# Patient Record
Sex: FEMALE
[De-identification: names, ages, dates, MRNs, and addresses within clinical notes are randomized per-mention and may not be internally consistent; named-entity substitution may affect disease eponyms.]

---

## 2020-12-28 PROBLEM — Z00.00 ENCOUNTER FOR PREVENTIVE HEALTH EXAMINATION: Status: ACTIVE | Noted: 2020-12-28

## 2021-01-07 ENCOUNTER — LABORATORY RESULT (OUTPATIENT)
Age: 61
End: 2021-01-07

## 2021-01-07 ENCOUNTER — APPOINTMENT (OUTPATIENT)
Dept: RHEUMATOLOGY | Facility: CLINIC | Age: 61
End: 2021-01-07
Payer: COMMERCIAL

## 2021-01-07 VITALS
HEART RATE: 73 BPM | SYSTOLIC BLOOD PRESSURE: 170 MMHG | DIASTOLIC BLOOD PRESSURE: 91 MMHG | WEIGHT: 120.38 LBS | HEIGHT: 63 IN | BODY MASS INDEX: 21.33 KG/M2 | TEMPERATURE: 98.2 F

## 2021-01-07 VITALS — OXYGEN SATURATION: 100 %

## 2021-01-07 DIAGNOSIS — R21 RASH AND OTHER NONSPECIFIC SKIN ERUPTION: ICD-10-CM

## 2021-01-07 DIAGNOSIS — R74.01 ELEVATION OF LEVELS OF LIVER TRANSAMINASE LEVELS: ICD-10-CM

## 2021-01-07 DIAGNOSIS — Z82.49 FAMILY HISTORY OF ISCHEMIC HEART DISEASE AND OTHER DISEASES OF THE CIRCULATORY SYSTEM: ICD-10-CM

## 2021-01-07 DIAGNOSIS — M79.89 OTHER SPECIFIED SOFT TISSUE DISORDERS: ICD-10-CM

## 2021-01-07 DIAGNOSIS — Z82.3 FAMILY HISTORY OF STROKE: ICD-10-CM

## 2021-01-07 DIAGNOSIS — Z80.9 FAMILY HISTORY OF MALIGNANT NEOPLASM, UNSPECIFIED: ICD-10-CM

## 2021-01-07 DIAGNOSIS — Z78.9 OTHER SPECIFIED HEALTH STATUS: ICD-10-CM

## 2021-01-07 DIAGNOSIS — L30.9 DERMATITIS, UNSPECIFIED: ICD-10-CM

## 2021-01-07 PROCEDURE — 99205 OFFICE O/P NEW HI 60 MIN: CPT | Mod: 25

## 2021-01-07 PROCEDURE — 99072 ADDL SUPL MATRL&STAF TM PHE: CPT

## 2021-01-07 PROCEDURE — 36415 COLL VENOUS BLD VENIPUNCTURE: CPT

## 2021-01-07 RX ORDER — MULTIVIT-MIN/FA/LYCOPEN/LUTEIN .4-300-25
TABLET ORAL
Refills: 0 | Status: ACTIVE | COMMUNITY

## 2021-01-07 RX ORDER — ASPIRIN ENTERIC COATED TABLETS 81 MG 81 MG/1
81 TABLET, DELAYED RELEASE ORAL
Refills: 0 | Status: ACTIVE | COMMUNITY

## 2021-01-07 RX ORDER — PHENYLEPHRINE HCL 10 MG
TABLET ORAL
Refills: 0 | Status: ACTIVE | COMMUNITY

## 2021-01-07 NOTE — REVIEW OF SYSTEMS
[Chills] : chills [Feeling Poorly] : feeling poorly [Feeling Tired] : feeling tired [Recent Weight Loss (___ Lbs)] : recent [unfilled] ~Ulb weight loss [SOB on Exertion] : shortness of breath during exertion [Arthralgias] : arthralgias [Joint Pain] : joint pain [Joint Swelling] : joint swelling [Joint Stiffness] : joint stiffness [Skin Lesions] : skin lesion [Itching] : itching [Limb Weakness] : limb weakness [Muscle Weakness] : muscle weakness [Fever] : no fever [Eye Pain] : no eye pain [Red Eyes] : eyes not red [Dry Eyes] : no dryness of the eyes [Chest Pain] : no chest pain [Palpitations] : no palpitations [Shortness Of Breath] : no shortness of breath [Wheezing] : no wheezing [Constipation] : no constipation [Diarrhea] : no diarrhea [Heartburn] : no heartburn [FreeTextEntry4] : Canker sores and dry mouth [de-identified] : As per HPI [de-identified] : Muscle weakness muscle tenderness

## 2021-01-07 NOTE — DATA REVIEWED
[FreeTextEntry1] : Laboratory data brought by patient was reviewed with her and her  on December 8, 2020 AST was elevated at 187 hepatitis B was negative CMV was consistent with prior exposure hepatitis C was negative CBC at that point revealed a hemoglobin of 9.3 white count and platelets were normal back in October 6 high-sensitivity CRP appeared normal thyroid studies appear normal including TSH rheumatoid factor was negative CARMEN was negative vitamin B12 levels were normal ESR at that point October 2 was elevated at 58 ALT was elevated 73 AST was elevated at 127 CBC at that point was also low at 8.9 more recent labs ANCA was negative smooth muscle antibody was negative dermatopathology report from thigh area was reviewed showing hypersensitivity reaction-she also reports that she did have x-rays of hands done at [facility] not available for me to review but apparently was told only of some thumb arthritis

## 2021-01-07 NOTE — ASSESSMENT
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 initially presenting with canker sores but then developing rashes joint pains and stiffness wrist swelling worse in the morning also some muscle pains and weakness as well as rash initially on hands now predominantly on thigh she has had elevated ESR she has had elevated transaminases-her clinical exam does show evidence of synovitis particularly in both wrists I think this could certainly represent a systemic rheumatic disease possibly rheumatoid arthritis possibly dermatomyositis with the elevated ALT and AST being of skeletal muscle I am doing a CPK I am repeating serologies inflammatory markers rheumatoid factor CCP I am also doing a tova - marker panel-I am also doing a G6PD screen should hydroxychloroquine be a consideration-I am awaiting these results and plan to discuss them with her and her  when they return for more definitive diagnosis will be made I suspect this may represent a systemic rheumatic process

## 2021-01-07 NOTE — HISTORY OF PRESENT ILLNESS
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-she has had an unexplained illness dating back to September 2020 with both skin and joint involvement prior to her evaluation by me she is seen by dermatology and allergy she is also seen by her cardiologist as well as gastroenterologist she is now being seen by rheumatology at the request of her gastroenterologist-she reports that initial symptoms involved canker sores in her mouth which were quite annoying and bothersome making eating difficult these eventually resolved she had not had these in many years except perhaps 20 to 30 years ago but again not recently she had not been exercising but was starting to try yoga but found this difficult because she was developing swelling in her hands particularly on the palmar surfaces of her hands they were also painful she also began noticing wrist pains and wrist swelling these were significantly worse in the morning she also began developing a rash on her hands and arms and persistent rash on both thigh area these were itchy in nature she was actually seen by dermatology she had a biopsy of the thigh rash which still persists the biopsy actually showed a hypersensitivity reaction a possible drug reaction this has been treated with topicals which have been not that effective-she was seen by allergy she did have patch testing which apparently were noncontributory she has had no fevers or chills again symptoms are worse in the morning she has difficulty in making a fist in the morning she has been taking Advil which has not helped she does report she was given a few days of very low-dose prednisone but stopped this as this also did not help she did have some weight loss of approximately 8 pounds but believes that she is gaining some of that back she has no coughing or shortness of breath but does have difficulty climbing flights of steps she reports she climbs up 2 flights of steps and gets dizzy she has no dry eyes but does have dryness of her mouth-she has been seen by a liver doctor because of some abnormalities in transaminases she does report that she has had muscle weakness in both her arms and her legs difficulty with exercising she is also had some muscle tenderness also

## 2021-01-07 NOTE — PHYSICAL EXAM
[General Appearance - Alert] : alert [General Appearance - In No Acute Distress] : in no acute distress [General Appearance - Well Nourished] : well nourished [General Appearance - Well Developed] : well developed [Sclera] : the sclera and conjunctiva were normal [PERRL With Normal Accommodation] : pupils were equal in size, round, and reactive to light [Outer Ear] : the ears and nose were normal in appearance [Neck Appearance] : the appearance of the neck was normal [Neck Cervical Mass (___cm)] : no neck mass was observed [Exaggerated Use Of Accessory Muscles For Inspiration] : no accessory muscle use [Auscultation Breath Sounds / Voice Sounds] : lungs were clear to auscultation bilaterally [Heart Rate And Rhythm] : heart rate was normal and rhythm regular [Murmurs] : no murmurs [Edema] : there was no peripheral edema [Abdomen Soft] : soft [Abdomen Tenderness] : non-tender [] : no hepato-splenomegaly [Nail Clubbing] : no clubbing  or cyanosis of the fingernails [No Focal Deficits] : no focal deficits [FreeTextEntry1] : Rashes present on the outer thigh more prominent on the right with some erythema no scaling

## 2021-01-07 NOTE — REASON FOR VISIT
[Initial Evaluation] : an initial evaluation [Spouse] : spouse [FreeTextEntry1] : rash / arthritis / increased transaminase

## 2021-01-08 LAB
ALBUMIN SERPL ELPH-MCNC: 4.8 G/DL
ALP BLD-CCNC: 53 U/L
ALT SERPL-CCNC: 105 U/L
ANION GAP SERPL CALC-SCNC: 12 MMOL/L
AST SERPL-CCNC: 142 U/L
BILIRUB SERPL-MCNC: 0.5 MG/DL
BUN SERPL-MCNC: 17 MG/DL
CALCIUM SERPL-MCNC: 10.3 MG/DL
CHLORIDE SERPL-SCNC: 89 MMOL/L
CK SERPL-CCNC: 1950 U/L
CO2 SERPL-SCNC: 27 MMOL/L
CREAT SERPL-MCNC: 0.6 MG/DL
CRP SERPL-MCNC: <0.1 MG/DL
ERYTHROCYTE [SEDIMENTATION RATE] IN BLOOD BY WESTERGREN METHOD: 58 MM/HR
GLUCOSE SERPL-MCNC: 84 MG/DL
POTASSIUM SERPL-SCNC: 4.4 MMOL/L
PROT SERPL-MCNC: 7.7 G/DL
RHEUMATOID FACT SER QL: <10 IU/ML
SODIUM SERPL-SCNC: 128 MMOL/L

## 2021-01-09 LAB
B BURGDOR IGG+IGM SER QL IB: NORMAL
BASOPHILS # BLD AUTO: 0.02 K/UL
BASOPHILS NFR BLD AUTO: 0.4 %
ENA RNP AB SER IA-ACNC: 0.2 AL
ENA SM AB SER IA-ACNC: <0.2 AL
ENA SS-A AB SER IA-ACNC: <0.2 AL
ENA SS-B AB SER IA-ACNC: 1.3 AL
EOSINOPHIL # BLD AUTO: 0.06 K/UL
EOSINOPHIL NFR BLD AUTO: 1.1 %
HCT VFR BLD CALC: 31.2 %
HGB BLD-MCNC: 9.5 G/DL
IMM GRANULOCYTES NFR BLD AUTO: 0.4 %
LYMPHOCYTES # BLD AUTO: 1.38 K/UL
LYMPHOCYTES NFR BLD AUTO: 25.5 %
MAN DIFF?: NORMAL
MCHC RBC-ENTMCNC: 20.1 PG
MCHC RBC-ENTMCNC: 30.4 GM/DL
MCV RBC AUTO: 66.1 FL
MONOCYTES # BLD AUTO: 0.56 K/UL
MONOCYTES NFR BLD AUTO: 10.4 %
NEUTROPHILS # BLD AUTO: 3.37 K/UL
NEUTROPHILS NFR BLD AUTO: 62.2 %
PLATELET # BLD AUTO: 276 K/UL
RBC # BLD: 4.72 M/UL
RBC # FLD: 18.6 %
WBC # FLD AUTO: 5.41 K/UL

## 2021-01-11 LAB
CCP AB SER IA-ACNC: <8 UNITS
RF+CCP IGG SER-IMP: NEGATIVE

## 2021-01-12 LAB
ANA SER IF-ACNC: NEGATIVE
G6PD SER-CCNC: 23.1 U/G HGB

## 2021-01-14 ENCOUNTER — APPOINTMENT (OUTPATIENT)
Dept: RADIOLOGY | Facility: CLINIC | Age: 61
End: 2021-01-14
Payer: COMMERCIAL

## 2021-01-14 ENCOUNTER — APPOINTMENT (OUTPATIENT)
Dept: RHEUMATOLOGY | Facility: CLINIC | Age: 61
End: 2021-01-14
Payer: COMMERCIAL

## 2021-01-14 ENCOUNTER — OUTPATIENT (OUTPATIENT)
Dept: OUTPATIENT SERVICES | Facility: HOSPITAL | Age: 61
LOS: 1 days | End: 2021-01-14

## 2021-01-14 VITALS
SYSTOLIC BLOOD PRESSURE: 165 MMHG | BODY MASS INDEX: 21.09 KG/M2 | HEIGHT: 63 IN | DIASTOLIC BLOOD PRESSURE: 75 MMHG | OXYGEN SATURATION: 95 % | HEART RATE: 78 BPM | WEIGHT: 119 LBS | TEMPERATURE: 98.1 F

## 2021-01-14 PROCEDURE — 99214 OFFICE O/P EST MOD 30 MIN: CPT | Mod: 25

## 2021-01-14 PROCEDURE — 36415 COLL VENOUS BLD VENIPUNCTURE: CPT

## 2021-01-14 PROCEDURE — 71046 X-RAY EXAM CHEST 2 VIEWS: CPT | Mod: 26

## 2021-01-14 PROCEDURE — 99072 ADDL SUPL MATRL&STAF TM PHE: CPT

## 2021-01-14 NOTE — DATA REVIEWED
[FreeTextEntry1] : Data as noted G6PD screen adequate ESR is elevated rheumatoid factor negative ALT AST again elevated with elevated creatinine as noted CRP is normal Lyme serologies are negative CCP and CARMEN both negative awaiting myositis panel

## 2021-01-14 NOTE — REVIEW OF SYSTEMS
[Feeling Tired] : feeling tired [Skin Lesions] : skin lesion [Itching] : itching [Shortness Of Breath] : no shortness of breath [Wheezing] : no wheezing [Cough] : no cough [FreeTextEntry9] : Muscle weakness

## 2021-01-14 NOTE — PHYSICAL EXAM
[General Appearance - Alert] : alert [General Appearance - In No Acute Distress] : in no acute distress [General Appearance - Well Nourished] : well nourished [General Appearance - Well Developed] : well developed [Sclera] : the sclera and conjunctiva were normal [PERRL With Normal Accommodation] : pupils were equal in size, round, and reactive to light [] : no respiratory distress [Exaggerated Use Of Accessory Muscles For Inspiration] : no accessory muscle use [Auscultation Breath Sounds / Voice Sounds] : lungs were clear to auscultation bilaterally [FreeTextEntry1] : No definite muscle weakness or tenderness

## 2021-01-14 NOTE — HISTORY OF PRESENT ILLNESS
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  she was seen by me approximately 1 week ago she had been referred she has been having some muscle weakness she was found to have elevated transaminases she has had a skin rash with a biopsy suggestive of some sort of allergic type of reaction she comes for review of labs she reports that she is actually feeling somewhat better she still has some muscle weakness rash is on changed she has had no fevers or chills she has had no respiratory complaints laboratory studies returned with CPK elevated almost to 2000 ESR also elevated strong suspicion that this might represent either dermatomyositis or another form of myositis possibly polymyositis possibly inclusion body myopathy CARMEN and rheumatoid factor are negative MATA is negative SSB weakly positive.-She denies coughing or shortness of breath no wheezing-regarding cancer screening she is up-to-date on mammography she does see a gynecologist but has had an oophorectomy and hysterectomy her last colonoscopy was 2014 more than likely will need to be updated

## 2021-01-14 NOTE — ASSESSMENT
[FreeTextEntry1] : I have explained to her that this most likely represents an inflammatory muscle disease a form of myositis possible polymyositis possible inclusion body myopathy possible dermatomyositis with rash I am obtaining QuantiFERON hepatitis testing prior to likely immunosuppression she did report she received the first more during her vaccine on December 30, 2020 without issue is scheduled to receive the next mode on her vaccine January 29, 2020 I am getting a chest x-ray and likely will be getting a CT of the chest I am referring her to neurology for a EMG and guidance for muscle biopsy will likely need a muscle biopsy

## 2021-01-15 LAB
HBV SURFACE AG SER QL: NONREACTIVE
HCV AB SER QL: NONREACTIVE
HCV S/CO RATIO: 0.09 S/CO

## 2021-01-16 ENCOUNTER — TRANSCRIPTION ENCOUNTER (OUTPATIENT)
Age: 61
End: 2021-01-16

## 2021-01-17 LAB
M TB IFN-G BLD-IMP: NEGATIVE
QUANTIFERON TB PLUS MITOGEN MINUS NIL: >10 IU/ML
QUANTIFERON TB PLUS NIL: 0.07 IU/ML
QUANTIFERON TB PLUS TB1 MINUS NIL: -0.02 IU/ML
QUANTIFERON TB PLUS TB2 MINUS NIL: -0.03 IU/ML

## 2021-01-22 ENCOUNTER — APPOINTMENT (OUTPATIENT)
Dept: NEUROLOGY | Facility: CLINIC | Age: 61
End: 2021-01-22
Payer: COMMERCIAL

## 2021-01-22 VITALS
DIASTOLIC BLOOD PRESSURE: 75 MMHG | HEIGHT: 63 IN | SYSTOLIC BLOOD PRESSURE: 145 MMHG | HEART RATE: 73 BPM | TEMPERATURE: 99.2 F | OXYGEN SATURATION: 98 % | WEIGHT: 119 LBS | BODY MASS INDEX: 21.09 KG/M2

## 2021-01-22 DIAGNOSIS — G56.03 CARPAL TUNNEL SYNDROM,BILATERAL UPPER LIMBS: ICD-10-CM

## 2021-01-22 DIAGNOSIS — M25.50 PAIN IN UNSPECIFIED JOINT: ICD-10-CM

## 2021-01-22 PROCEDURE — 95885 MUSC TST DONE W/NERV TST LIM: CPT

## 2021-01-22 PROCEDURE — 99072 ADDL SUPL MATRL&STAF TM PHE: CPT

## 2021-01-22 PROCEDURE — 95913 NRV CNDJ TEST 13/> STUDIES: CPT

## 2021-01-22 PROCEDURE — 99244 OFF/OP CNSLTJ NEW/EST MOD 40: CPT | Mod: 25

## 2021-01-27 PROBLEM — G56.03 BILATERAL CARPAL TUNNEL SYNDROME: Status: ACTIVE | Noted: 2021-01-27

## 2021-01-27 PROBLEM — M25.50 JOINT PAIN: Status: ACTIVE | Noted: 2021-01-07

## 2021-01-27 NOTE — HISTORY OF PRESENT ILLNESS
[FreeTextEntry1] : Referred by Dr. Garcia for weakness and paresthesias\par Symptoms started about 4 months ago with swelling in the hands and wrists, then numbness and tingling in the hands, and muscle weakness\par She developed rash in the anterior forearms and thighs, some puffiness and erythema on the face\par Weakness has improved, numbness/tingling is still present \par \par Reviewed:\par Rheumatology notes\par Labs - ESR 58, CK 1950, SSB 1.3l; CARMEN, CRP, Lyme, MATA, RF, CCP negative \par Skin biopsy in September was nonspecific

## 2021-01-27 NOTE — ASSESSMENT
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 Garcia\par \par See separate procedure note for full results of study.

## 2021-01-27 NOTE — PHYSICAL EXAM
[FreeTextEntry1] : Gen: appears well, well-nourished, no acute distress\par \par MS: awake, alert, oriented, speech fluent, comprehension intact, good fund of knowledge, recent and remote memory intact, attention intact\par \par CN: PERRL, EOMI, visual fields full, facial strength and sensation intact and symmetric, palate elevation symmetric, tongue midline, no tongue atrophy or fasciculations\par \par Motor: normal bulk and tone, neck flexion 4, deltoids 4, biceps 4+, triceps 4, hip flexion 4, otherwise 5/5 strength throughout, no abnormal movements\par \par Sensory: vibration mildly reduced at toes and ankles b/l\par \par Reflexes: 2+ symmetric throughout, mild brock's sign b/l, plantar responses flexor b/l\par \par Coordination: no dysmetria \par \par Gait: normal\par \par Skin: no discernible rash

## 2021-01-27 NOTE — PROCEDURE
[FreeTextEntry1] : Nerve Conduction and Electromyography Report [FreeTextEntry3] : Electro Physiologic Findings:\par \par Limb temperature was monitored and maintained at approximately 30 – 34° C in the lower extremities, and 32 – 36° C in the upper extremities.\par \par The median sensory responses were low amplitude bilaterally, with complete sensory conduction block across the left wrist. The median mixed nerve responses were very slow across the wrists bilaterally. The median distal motor latencies were very prolonged bilaterally with about 50% conduction block on both sides. The lumbrical studies were positive bilaterally. \par \par The right superficial fibular sensory response was normal. The right fibular and tibial motor responses, including F-wave latencies, were also normal. \par \par Needle electromyography was performed on select right upper and lower extremity appendicular muscles. There was a moderate amount of spontaneous activity in the right tibiails anterior and the deltoid, and a mild amount in the biceps brachii. The deltoid and biceps also demonstrated myopathic (low amplitude and short duration) motor units with an early / full recruitment pattern. \par \par Clinical Electrophysiological Impression: \par \par This electrodiagnostic study demonstrated a mild-to-moderate irritative myopathy, consistent with the clinical impression of an inflammatory myositis. \par \par There was also evidence of moderate-to-severe median nerve entrapments at both wrists, with severe sensorimotor slowing and about 50% motor conduction block bilaterally. \par \par There was no evidence of polyneuropathy on this study.

## 2021-01-28 LAB
EJ AB SER QL: NEGATIVE
ENA JO1 AB SER IA-ACNC: <20 UNITS
ENA PM/SCL AB SER-ACNC: <20 UNITS
ENA SM+RNP AB SER IA-ACNC: <20 UNITS
ENA SS-A IGG SER QL: <20 UNITS
FIBRILLARIN AB SER QL: NEGATIVE
KU AB SER QL: NEGATIVE
MDA-5 (P140)(CADM-140): <20 UNITS
MI2 AB SER QL: NEGATIVE
NXP-2 (P140): <20 UNITS
OJ AB SER QL: NEGATIVE
PL12 AB SER QL: NEGATIVE
PL7 AB SER QL: NEGATIVE
SRP AB SERPL QL: NEGATIVE
TIF GAMMA (P155/140): <20 UNITS
U2 SNRNP AB SER QL: NEGATIVE

## 2021-01-29 ENCOUNTER — NON-APPOINTMENT (OUTPATIENT)
Age: 61
End: 2021-01-29

## 2021-02-02 ENCOUNTER — APPOINTMENT (OUTPATIENT)
Dept: VASCULAR SURGERY | Facility: CLINIC | Age: 61
End: 2021-02-02
Payer: COMMERCIAL

## 2021-02-02 DIAGNOSIS — Z01.818 ENCOUNTER FOR OTHER PREPROCEDURAL EXAMINATION: ICD-10-CM

## 2021-02-02 PROCEDURE — 99203 OFFICE O/P NEW LOW 30 MIN: CPT

## 2021-02-02 PROCEDURE — 99072 ADDL SUPL MATRL&STAF TM PHE: CPT

## 2021-02-03 NOTE — PHYSICAL EXAM
[Respiratory Effort] : normal respiratory effort [Normal Heart Sounds] : normal heart sounds [2+] : left 2+ [Alert] : alert [Oriented to Person] : oriented to person [Oriented to Place] : oriented to place [Oriented to Time] : oriented to time [Calm] : calm [Ankle Swelling (On Exam)] : not present [Varicose Veins Of Lower Extremities] : not present [] : not present [de-identified] : well appearing

## 2021-02-03 NOTE — HISTORY OF PRESENT ILLNESS
[FreeTextEntry1] : 60 year old female with PMH of HTN presenting to the office to discuss left deltoid biopsy. SHe is followed by rheum (Dr. Garcia) and Dr. Vineet Dent (Neuro) and she was diagnosed with possible inflammatory myositis, possibly dermatomyositis but also potentially anti-synthetase syndrome. They have discussed muscle biopsy prior to starting steroids. She works at [employer], difficult for her to get here. \par \par She is a Dentist.

## 2021-02-03 NOTE — ASSESSMENT
[FreeTextEntry1] : 60 year old female with PMH of HTN presenting to the office to discuss left deltoid biopsy. SHe is followed by rheum (Dr. Garcia) and Dr. Vineet Dent (Neuro) and she was diagnosed with possible inflammatory myositis, possibly dermatomyositis but also potentially anti-synthetase syndrome. They have discussed muscle biopsy prior to starting steroids. \par \par Will schedule her for left deltoid muscle biopsy, will do on Thursday per patients preference, will get COVID tested 3 days prior in [facility], labs in [employer] and EKG done at her cardiologist office. \par \par Risks of procedure discussed with patient, including pain, bleeding, infection, nerve and muscle injury, inconclusive diagnosis. Agreeable to proceed.

## 2021-02-10 ENCOUNTER — TRANSCRIPTION ENCOUNTER (OUTPATIENT)
Age: 61
End: 2021-02-10

## 2021-02-10 VITALS
TEMPERATURE: 97 F | WEIGHT: 117.95 LBS | HEART RATE: 62 BPM | OXYGEN SATURATION: 100 % | RESPIRATION RATE: 16 BRPM | DIASTOLIC BLOOD PRESSURE: 90 MMHG | SYSTOLIC BLOOD PRESSURE: 163 MMHG | HEIGHT: 64 IN

## 2021-02-11 ENCOUNTER — APPOINTMENT (OUTPATIENT)
Dept: VASCULAR SURGERY | Facility: HOSPITAL | Age: 61
End: 2021-02-11

## 2021-02-11 ENCOUNTER — RESULT REVIEW (OUTPATIENT)
Age: 61
End: 2021-02-11

## 2021-02-11 ENCOUNTER — OUTPATIENT (OUTPATIENT)
Dept: OUTPATIENT SERVICES | Facility: HOSPITAL | Age: 61
LOS: 1 days | Discharge: HOME | End: 2021-02-11
Payer: COMMERCIAL

## 2021-02-11 VITALS
RESPIRATION RATE: 16 BRPM | OXYGEN SATURATION: 100 % | HEART RATE: 66 BPM | SYSTOLIC BLOOD PRESSURE: 166 MMHG | DIASTOLIC BLOOD PRESSURE: 85 MMHG | TEMPERATURE: 97 F

## 2021-02-11 DIAGNOSIS — Z98.890 OTHER SPECIFIED POSTPROCEDURAL STATES: Chronic | ICD-10-CM

## 2021-02-11 PROCEDURE — 88313 SPECIAL STAINS GROUP 2: CPT | Mod: 26

## 2021-02-11 PROCEDURE — 88305 TISSUE EXAM BY PATHOLOGIST: CPT | Mod: 26

## 2021-02-11 PROCEDURE — 88319 ENZYME HISTOCHEMISTRY: CPT | Mod: 26

## 2021-02-11 PROCEDURE — 88314 HISTOCHEMICAL STAINS ADD-ON: CPT

## 2021-02-11 PROCEDURE — 88313 SPECIAL STAINS GROUP 2: CPT

## 2021-02-11 PROCEDURE — 88305 TISSUE EXAM BY PATHOLOGIST: CPT

## 2021-02-11 PROCEDURE — 20205 DEEP MUSCLE BIOPSY: CPT | Mod: GC

## 2021-02-11 PROCEDURE — 88319 ENZYME HISTOCHEMISTRY: CPT

## 2021-02-11 PROCEDURE — 20205 DEEP MUSCLE BIOPSY: CPT

## 2021-02-11 PROCEDURE — 88314 HISTOCHEMICAL STAINS ADD-ON: CPT | Mod: 26

## 2021-02-11 RX ORDER — ACETAMINOPHEN 500 MG
650 TABLET ORAL EVERY 6 HOURS
Refills: 0 | Status: DISCONTINUED | OUTPATIENT
Start: 2021-02-11 | End: 2021-02-11

## 2021-02-11 NOTE — BRIEF OPERATIVE NOTE - OPERATION/FINDINGS
Longitudinal 5 cm incision made over L deltoid. Dissected down to muscle taking care to avoid nerve or vascular injury. Isolated muscle w right angle x3. Tied off proximal and distal ends of each of the isolated segments of muscle and excised the interposed segments for fresh specimen biopsy x3. Closed w deep dermal vicryl followed by running monocryl suture.

## 2021-02-11 NOTE — PACU DISCHARGE NOTE - COMMENTS
Discharge criteria met and patient discharged by Anesthesia. Instructions given to patient. IV removed and catheter intact.

## 2021-02-12 LAB — SURGICAL PATHOLOGY STUDY: SIGNIFICANT CHANGE UP

## 2021-02-18 RX ORDER — SULFAMETHOXAZOLE AND TRIMETHOPRIM 800; 160 MG/1; MG/1
800-160 TABLET ORAL
Qty: 12 | Refills: 11 | Status: ACTIVE | COMMUNITY
Start: 2021-02-18 | End: 1900-01-01

## 2021-02-19 ENCOUNTER — NON-APPOINTMENT (OUTPATIENT)
Age: 61
End: 2021-02-19

## 2021-02-20 ENCOUNTER — NON-APPOINTMENT (OUTPATIENT)
Age: 61
End: 2021-02-20

## 2021-02-22 ENCOUNTER — NON-APPOINTMENT (OUTPATIENT)
Age: 61
End: 2021-02-22

## 2021-03-08 PROBLEM — I10 ESSENTIAL (PRIMARY) HYPERTENSION: Chronic | Status: ACTIVE | Noted: 2021-02-10

## 2021-03-25 ENCOUNTER — APPOINTMENT (OUTPATIENT)
Dept: RHEUMATOLOGY | Facility: CLINIC | Age: 61
End: 2021-03-25
Payer: COMMERCIAL

## 2021-03-25 ENCOUNTER — LABORATORY RESULT (OUTPATIENT)
Age: 61
End: 2021-03-25

## 2021-03-25 VITALS
HEART RATE: 83 BPM | HEIGHT: 63 IN | SYSTOLIC BLOOD PRESSURE: 175 MMHG | BODY MASS INDEX: 20.38 KG/M2 | TEMPERATURE: 97.7 F | DIASTOLIC BLOOD PRESSURE: 92 MMHG | WEIGHT: 115 LBS | OXYGEN SATURATION: 100 %

## 2021-03-25 DIAGNOSIS — M89.9 DISORDER OF BONE, UNSPECIFIED: ICD-10-CM

## 2021-03-25 PROCEDURE — 36415 COLL VENOUS BLD VENIPUNCTURE: CPT

## 2021-03-25 PROCEDURE — 99072 ADDL SUPL MATRL&STAF TM PHE: CPT

## 2021-03-25 PROCEDURE — 99214 OFFICE O/P EST MOD 30 MIN: CPT | Mod: 25

## 2021-03-26 LAB
ALBUMIN SERPL ELPH-MCNC: 4.8 G/DL
ALP BLD-CCNC: 37 U/L
ALT SERPL-CCNC: 53 U/L
ANION GAP SERPL CALC-SCNC: 15 MMOL/L
AST SERPL-CCNC: 49 U/L
BASOPHILS # BLD AUTO: 0 K/UL
BASOPHILS NFR BLD AUTO: 0 %
BILIRUB SERPL-MCNC: 0.5 MG/DL
BUN SERPL-MCNC: 19 MG/DL
CALCIUM SERPL-MCNC: 10.3 MG/DL
CHLORIDE SERPL-SCNC: 87 MMOL/L
CK SERPL-CCNC: 487 U/L
CO2 SERPL-SCNC: 26 MMOL/L
CREAT SERPL-MCNC: 0.54 MG/DL
CRP SERPL-MCNC: <3 MG/L
EOSINOPHIL # BLD AUTO: 0 K/UL
EOSINOPHIL NFR BLD AUTO: 0 %
ERYTHROCYTE [SEDIMENTATION RATE] IN BLOOD BY WESTERGREN METHOD: 20 MM/HR
GLUCOSE SERPL-MCNC: 206 MG/DL
HCT VFR BLD CALC: 30.6 %
HGB BLD-MCNC: 10 G/DL
LYMPHOCYTES # BLD AUTO: 0.55 K/UL
LYMPHOCYTES NFR BLD AUTO: 9.5 %
MAN DIFF?: NORMAL
MCHC RBC-ENTMCNC: 21 PG
MCHC RBC-ENTMCNC: 32.7 GM/DL
MCV RBC AUTO: 64.3 FL
MONOCYTES # BLD AUTO: 0.05 K/UL
MONOCYTES NFR BLD AUTO: 0.9 %
NEUTROPHILS # BLD AUTO: 5.14 K/UL
NEUTROPHILS NFR BLD AUTO: 88.7 %
PLATELET # BLD AUTO: 329 K/UL
POTASSIUM SERPL-SCNC: 4.1 MMOL/L
PROT SERPL-MCNC: 7.3 G/DL
RBC # BLD: 4.76 M/UL
RBC # FLD: 16.7 %
SODIUM SERPL-SCNC: 128 MMOL/L
WBC # FLD AUTO: 5.79 K/UL

## 2021-03-26 NOTE — HISTORY OF PRESENT ILLNESS
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 she did have some synovitis of a wrist on initial presentation CPK was around 2000 she has been on 60 of prednisone now for approximately 4 weeks with anticipating reduction in dose possibly adding a second steroid sparing agent she is also been taking Bactrim 3 times a week as PCP prophylaxis she reports her skin is much better she does feel generally better her muscle strength has improved she has no real muscle weakness she has had no other residual issues no coughing no shortness of breath she has yet to have a baseline bone density because of anticipated relatively long-term treatment with steroids nothing else has changed she did have cancer screening age-appropriate appears to be up-to-date as per our discussions

## 2021-03-26 NOTE — ASSESSMENT
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 suggestive of lung involvement inflammatory muscle disease biopsy consistent with dermatomyositis no evidence of any or suggestion of anyLung involvement   She appears better I am updating all bloods today I am also obtaining a baseline bone density prior to her next visit I did discuss long-term plans of tapering prednisone once CPK normalizes hopefully it is with these bloods I will also likely add a steroid sparing agent either methotrexate or azathioprine but will await repeat blood work I again discussed with  and wife need for ongoing monitoring for possible malignancy as this can potentially be a malignancy associated process

## 2021-03-26 NOTE — REVIEW OF SYSTEMS
[Fever] : no fever [Chills] : no chills [Feeling Poorly] : not feeling poorly [Feeling Tired] : not feeling tired [Heart Rate Is Slow] : the heart rate was not slow [Chest Pain] : no chest pain [Shortness Of Breath] : no shortness of breath [Cough] : no cough [SOB on Exertion] : no shortness of breath during exertion

## 2021-03-26 NOTE — PHYSICAL EXAM
[General Appearance - Alert] : alert [General Appearance - In No Acute Distress] : in no acute distress [General Appearance - Well Nourished] : well nourished [General Appearance - Well Developed] : well developed [Sclera] : the sclera and conjunctiva were normal [PERRL With Normal Accommodation] : pupils were equal in size, round, and reactive to light [Respiration, Rhythm And Depth] : normal respiratory rhythm and effort [Exaggerated Use Of Accessory Muscles For Inspiration] : no accessory muscle use [Auscultation Breath Sounds / Voice Sounds] : lungs were clear to auscultation bilaterally [Heart Sounds] : normal S1 and S2 [Heart Sounds Gallop] : no gallops [No Spinal Tenderness] : no spinal tenderness [Abnormal Walk] : normal gait [Musculoskeletal - Swelling] : no joint swelling seen [Skin Color & Pigmentation] : normal skin color and pigmentation [] : no rash [FreeTextEntry1] : Improve rash

## 2021-03-29 ENCOUNTER — NON-APPOINTMENT (OUTPATIENT)
Age: 61
End: 2021-03-29

## 2021-04-30 ENCOUNTER — NON-APPOINTMENT (OUTPATIENT)
Age: 61
End: 2021-04-30

## 2021-05-07 ENCOUNTER — NON-APPOINTMENT (OUTPATIENT)
Age: 61
End: 2021-05-07

## 2021-05-20 ENCOUNTER — APPOINTMENT (OUTPATIENT)
Dept: RADIOLOGY | Facility: CLINIC | Age: 61
End: 2021-05-20
Payer: COMMERCIAL

## 2021-05-20 ENCOUNTER — LABORATORY RESULT (OUTPATIENT)
Age: 61
End: 2021-05-20

## 2021-05-20 ENCOUNTER — APPOINTMENT (OUTPATIENT)
Dept: RHEUMATOLOGY | Facility: CLINIC | Age: 61
End: 2021-05-20
Payer: COMMERCIAL

## 2021-05-20 ENCOUNTER — RESULT REVIEW (OUTPATIENT)
Age: 61
End: 2021-05-20

## 2021-05-20 VITALS
SYSTOLIC BLOOD PRESSURE: 167 MMHG | WEIGHT: 117.13 LBS | OXYGEN SATURATION: 100 % | BODY MASS INDEX: 20.75 KG/M2 | DIASTOLIC BLOOD PRESSURE: 91 MMHG | HEIGHT: 63 IN | TEMPERATURE: 97.9 F | HEART RATE: 70 BPM

## 2021-05-20 DIAGNOSIS — M19.90 UNSPECIFIED OSTEOARTHRITIS, UNSPECIFIED SITE: ICD-10-CM

## 2021-05-20 PROCEDURE — 36415 COLL VENOUS BLD VENIPUNCTURE: CPT

## 2021-05-20 PROCEDURE — 99072 ADDL SUPL MATRL&STAF TM PHE: CPT

## 2021-05-20 PROCEDURE — 99214 OFFICE O/P EST MOD 30 MIN: CPT | Mod: 25

## 2021-05-20 PROCEDURE — 77085 DXA BONE DENSITY AXL VRT FX: CPT | Mod: 26

## 2021-05-21 LAB
ALBUMIN SERPL ELPH-MCNC: 4.6 G/DL
ALP BLD-CCNC: 35 U/L
ALT SERPL-CCNC: 20 U/L
ANION GAP SERPL CALC-SCNC: 13 MMOL/L
AST SERPL-CCNC: 21 U/L
BASOPHILS # BLD AUTO: 0 K/UL
BASOPHILS NFR BLD AUTO: 0 %
BILIRUB SERPL-MCNC: 0.4 MG/DL
BUN SERPL-MCNC: 30 MG/DL
CALCIUM SERPL-MCNC: 10.2 MG/DL
CHLORIDE SERPL-SCNC: 88 MMOL/L
CK SERPL-CCNC: 49 U/L
CO2 SERPL-SCNC: 25 MMOL/L
CREAT SERPL-MCNC: 0.6 MG/DL
EOSINOPHIL # BLD AUTO: 0 K/UL
EOSINOPHIL NFR BLD AUTO: 0 %
GLUCOSE SERPL-MCNC: 111 MG/DL
HCT VFR BLD CALC: 32.1 %
HGB BLD-MCNC: 10.3 G/DL
LYMPHOCYTES # BLD AUTO: 2.11 K/UL
LYMPHOCYTES NFR BLD AUTO: 29.1 %
MAN DIFF?: NORMAL
MCHC RBC-ENTMCNC: 21.2 PG
MCHC RBC-ENTMCNC: 32.1 GM/DL
MCV RBC AUTO: 66.2 FL
MONOCYTES # BLD AUTO: 0.25 K/UL
MONOCYTES NFR BLD AUTO: 3.4 %
NEUTROPHILS # BLD AUTO: 4.9 K/UL
NEUTROPHILS NFR BLD AUTO: 67.5 %
PLATELET # BLD AUTO: 299 K/UL
POTASSIUM SERPL-SCNC: 3.9 MMOL/L
PROT SERPL-MCNC: 6.9 G/DL
RBC # BLD: 4.85 M/UL
RBC # FLD: 15.3 %
SODIUM SERPL-SCNC: 126 MMOL/L
WBC # FLD AUTO: 7.26 K/UL

## 2021-05-21 NOTE — DATA REVIEWED
[FreeTextEntry1] : Previous bloods from March were reviewed with patient CPK remained elevated I have also reviewed with her her bone density which was done shortly before the visit although it has not been officially read it does not show osteoporosis it does show osteopenia a FRAX score was calculated and she is below the threshold for treatment despite steroid use my intention is to reduce dose of steroids as quickly as possible

## 2021-05-21 NOTE — REVIEW OF SYSTEMS
[Feeling Tired] : feeling tired [Shortness Of Breath] : no shortness of breath [Wheezing] : no wheezing [Cough] : no cough [SOB on Exertion] : no shortness of breath during exertion

## 2021-05-21 NOTE — PHYSICAL EXAM
[General Appearance - Alert] : alert [General Appearance - In No Acute Distress] : in no acute distress [General Appearance - Well Nourished] : well nourished [General Appearance - Well Developed] : well developed [General Appearance - Well-Appearing] : healthy appearing [Sclera] : the sclera and conjunctiva were normal [PERRL With Normal Accommodation] : pupils were equal in size, round, and reactive to light [Neck Appearance] : the appearance of the neck was normal [Neck Cervical Mass (___cm)] : no neck mass was observed [Respiration, Rhythm And Depth] : normal respiratory rhythm and effort [Exaggerated Use Of Accessory Muscles For Inspiration] : no accessory muscle use [Auscultation Breath Sounds / Voice Sounds] : lungs were clear to auscultation bilaterally [Apical Impulse] : the apical impulse was normal [Heart Sounds] : normal S1 and S2 [Murmurs] : no murmurs [Edema] : there was no peripheral edema [No CVA Tenderness] : no ~M costovertebral angle tenderness [No Spinal Tenderness] : no spinal tenderness [Abnormal Walk] : normal gait [Nail Clubbing] : no clubbing  or cyanosis of the fingernails [Musculoskeletal - Swelling] : no joint swelling seen [Skin Color & Pigmentation] : normal skin color and pigmentation [] : no rash [FreeTextEntry1] : There is no discernible muscle weakness or muscle tenderness she is able to easily get up out of a chair without using her arms

## 2021-05-21 NOTE — ASSESSMENT
[FreeTextEntry1] : This is a 60-year-old woman she has had dermatomyositis features of an overlap syndrome she did have an inflammatory arthritis she appears to be doing very well and tolerating prednisone very well there is resolution of rash there is improvement of muscle strength there is no muscle tenderness she has had no pulmonary disease and previous evaluation for possible associations of cancers have been negative to date I did discuss with her that we should start tapering her methyprednisolone  based on her clinical picture-I am advising that she go down to 32 mg of Medrol I have also introducing methotrexate as a steroid sparing agent risks and benefits of methotrexate have been reviewed with her in detail I plan on starting her at 15 mg once a week along with folic acid 1 mg a day previous liver studies and hepatitis testing have all been negative I did advise her to start the folic acid a few days in advance I am continuing her off of Bactrim and holding on additional prophylaxis against PCP with the hope of further improving of and reducing of steroids-finally she does not have osteoporosis she does have osteopenia I am awaiting official review of her bone density but I do not feel antiresorptive medication are indicated at the current time-long-term management of this disease has been discussed with both her and her  my hope is to eventually eliminate prednisone continuing methotrexate as monotherapy along with folic acid continuing age-appropriate routine cancer monitoring as outlined-I am updating routine bloods today as well as CPK bloods will be drawn in the office

## 2021-05-21 NOTE — HISTORY OF PRESENT ILLNESS
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 she has than diagnosed as having dermatomyositis she has had a diagnostic muscle biopsy she has been on methylprednisolone which she tolerates better than prednisone she is now down to 40 mg a day she is for the most part tolerating it well she comes to review bone density which was done today to see if additional therapies are warranted-she also is here to update bloods and discuss additional medication management she does report she has gained some weight although she does not appear cushingoid her rash has improved there is a component of her rash which I suspect represented a sulfur allergy she is now off Bactrim which was used as prophylaxis against PCP she believes the strength in her muscles have improved her energy level does remain low she denies any respiratory symptoms no coughing no shortness of breath no other significant issues-she does have coexisting hypertension this appears to be acceptable despite the steroid use

## 2021-06-04 ENCOUNTER — NON-APPOINTMENT (OUTPATIENT)
Age: 61
End: 2021-06-04

## 2021-06-24 ENCOUNTER — NON-APPOINTMENT (OUTPATIENT)
Age: 61
End: 2021-06-24

## 2021-06-24 ENCOUNTER — APPOINTMENT (OUTPATIENT)
Dept: RHEUMATOLOGY | Facility: CLINIC | Age: 61
End: 2021-06-24
Payer: COMMERCIAL

## 2021-06-24 VITALS
HEIGHT: 63 IN | BODY MASS INDEX: 22.06 KG/M2 | DIASTOLIC BLOOD PRESSURE: 93 MMHG | HEART RATE: 75 BPM | SYSTOLIC BLOOD PRESSURE: 171 MMHG | TEMPERATURE: 97.9 F | WEIGHT: 124.5 LBS

## 2021-06-24 DIAGNOSIS — Z79.899 OTHER LONG TERM (CURRENT) DRUG THERAPY: ICD-10-CM

## 2021-06-24 PROCEDURE — 99072 ADDL SUPL MATRL&STAF TM PHE: CPT

## 2021-06-24 PROCEDURE — 99214 OFFICE O/P EST MOD 30 MIN: CPT | Mod: 25

## 2021-06-24 PROCEDURE — 36415 COLL VENOUS BLD VENIPUNCTURE: CPT

## 2021-06-24 RX ORDER — METOPROLOL TARTRATE 100 MG/1
100 TABLET, FILM COATED ORAL DAILY
Qty: 30 | Refills: 0 | Status: ACTIVE | COMMUNITY

## 2021-06-24 NOTE — HISTORY OF PRESENT ILLNESS
[FreeTextEntry1] : feeling much better -medrol at 24 mg / day , mtx 15 mg / week , stopped bactrim folic acid a day - hyponatremia corrected -this is a 60-year-old woman she has biopsy-proven dermatomyositis she is on tapering doses of methylprednisolone she is on methotrexate and folic acid she is continuing to feel quite well no pain swelling or stiffness of joints no muscle pains recent CPK done about a month ago was now completely normalized previous ones were quite elevated she did have a low sodium but this has been adjusted through her cardiologist and repeats have been normal she comes in for follow-up she is not had any rashes she needs renewal of medications she is off Bactrim because of concerns about a possibility of a drug reaction this has actually resolved

## 2021-06-24 NOTE — PHYSICAL EXAM
[General Appearance - Alert] : alert [General Appearance - In No Acute Distress] : in no acute distress [General Appearance - Well Nourished] : well nourished [General Appearance - Well Developed] : well developed [Sclera] : the sclera and conjunctiva were normal [PERRL With Normal Accommodation] : pupils were equal in size, round, and reactive to light [Respiration, Rhythm And Depth] : normal respiratory rhythm and effort [Exaggerated Use Of Accessory Muscles For Inspiration] : no accessory muscle use [Edema] : there was no peripheral edema [Abnormal Walk] : normal gait [Musculoskeletal - Swelling] : no joint swelling seen [Skin Color & Pigmentation] : normal skin color and pigmentation [] : no rash [FreeTextEntry1] : No muscle tenderness no muscle weakness

## 2021-06-24 NOTE — REVIEW OF SYSTEMS
[Chills] : no chills [Fever] : no fever [Feeling Poorly] : not feeling poorly [Feeling Tired] : not feeling tired [Eye Pain] : no eye pain [Dry Eyes] : no dryness of the eyes [Shortness Of Breath] : no shortness of breath [Cough] : no cough [SOB on Exertion] : no shortness of breath during exertion [Arthralgias] : no arthralgias [Joint Pain] : no joint pain [Joint Stiffness] : no joint stiffness [Joint Swelling] : no joint swelling [Skin Lesions] : no skin lesions [Itching] : no itching

## 2021-06-24 NOTE — ASSESSMENT
[FreeTextEntry1] : This is a 60-year-old woman she has dermatomyositis she is doing very well I am updating bloods today but will be reducing methylprednisolone to 16 mg a day continuing methotrexate at 15 mg a week risks and benefits again reviewed with her will be updating bloods today will be reevaluating her again in 4 weeks

## 2021-06-25 LAB
ALBUMIN SERPL ELPH-MCNC: 4.7 G/DL
ALP BLD-CCNC: 40 U/L
ALT SERPL-CCNC: 32 U/L
ANION GAP SERPL CALC-SCNC: 14 MMOL/L
AST SERPL-CCNC: 24 U/L
BASOPHILS # BLD AUTO: 0.02 K/UL
BASOPHILS NFR BLD AUTO: 0.2 %
BILIRUB SERPL-MCNC: 0.4 MG/DL
BUN SERPL-MCNC: 23 MG/DL
CALCIUM SERPL-MCNC: 9.7 MG/DL
CHLORIDE SERPL-SCNC: 93 MMOL/L
CK SERPL-CCNC: 30 U/L
CO2 SERPL-SCNC: 27 MMOL/L
CREAT SERPL-MCNC: 0.63 MG/DL
CRP SERPL-MCNC: <3 MG/L
EOSINOPHIL # BLD AUTO: 0 K/UL
EOSINOPHIL NFR BLD AUTO: 0 %
ERYTHROCYTE [SEDIMENTATION RATE] IN BLOOD BY WESTERGREN METHOD: 11 MM/HR
GLUCOSE SERPL-MCNC: 72 MG/DL
HCT VFR BLD CALC: 34.5 %
HGB BLD-MCNC: 10.3 G/DL
IMM GRANULOCYTES NFR BLD AUTO: 0.7 %
LYMPHOCYTES # BLD AUTO: 2.16 K/UL
LYMPHOCYTES NFR BLD AUTO: 18 %
MAN DIFF?: NORMAL
MCHC RBC-ENTMCNC: 21.1 PG
MCHC RBC-ENTMCNC: 29.9 GM/DL
MCV RBC AUTO: 70.6 FL
MONOCYTES # BLD AUTO: 0.7 K/UL
MONOCYTES NFR BLD AUTO: 5.8 %
NEUTROPHILS # BLD AUTO: 9.06 K/UL
NEUTROPHILS NFR BLD AUTO: 75.3 %
PLATELET # BLD AUTO: 311 K/UL
POTASSIUM SERPL-SCNC: 3.9 MMOL/L
PROT SERPL-MCNC: 6.7 G/DL
RBC # BLD: 4.89 M/UL
RBC # FLD: 16.4 %
SODIUM SERPL-SCNC: 134 MMOL/L
WBC # FLD AUTO: 12.03 K/UL

## 2021-07-29 ENCOUNTER — APPOINTMENT (OUTPATIENT)
Dept: RHEUMATOLOGY | Facility: CLINIC | Age: 61
End: 2021-07-29
Payer: COMMERCIAL

## 2021-07-29 ENCOUNTER — LABORATORY RESULT (OUTPATIENT)
Age: 61
End: 2021-07-29

## 2021-07-29 VITALS
WEIGHT: 126.38 LBS | OXYGEN SATURATION: 99 % | HEART RATE: 84 BPM | HEIGHT: 63 IN | DIASTOLIC BLOOD PRESSURE: 96 MMHG | SYSTOLIC BLOOD PRESSURE: 180 MMHG | TEMPERATURE: 98.3 F | BODY MASS INDEX: 22.39 KG/M2

## 2021-07-29 DIAGNOSIS — E87.1 HYPO-OSMOLALITY AND HYPONATREMIA: ICD-10-CM

## 2021-07-29 PROCEDURE — 99214 OFFICE O/P EST MOD 30 MIN: CPT

## 2021-07-30 PROBLEM — E87.1 HYPONATREMIA: Status: ACTIVE | Noted: 2021-07-30

## 2021-07-30 LAB
ALBUMIN SERPL ELPH-MCNC: 5 G/DL
ALP BLD-CCNC: 45 U/L
ALT SERPL-CCNC: 27 U/L
ANION GAP SERPL CALC-SCNC: 12 MMOL/L
AST SERPL-CCNC: 25 U/L
BASOPHILS # BLD AUTO: 0.01 K/UL
BASOPHILS NFR BLD AUTO: 0.1 %
BILIRUB SERPL-MCNC: 0.4 MG/DL
BUN SERPL-MCNC: 23 MG/DL
CALCIUM SERPL-MCNC: 10.3 MG/DL
CHLORIDE SERPL-SCNC: 89 MMOL/L
CK SERPL-CCNC: 29 U/L
CO2 SERPL-SCNC: 27 MMOL/L
CREAT SERPL-MCNC: 0.6 MG/DL
EOSINOPHIL # BLD AUTO: 0 K/UL
EOSINOPHIL NFR BLD AUTO: 0 %
GLUCOSE SERPL-MCNC: 84 MG/DL
HCT VFR BLD CALC: 34 %
HGB BLD-MCNC: 11 G/DL
IMM GRANULOCYTES NFR BLD AUTO: 0.6 %
LYMPHOCYTES # BLD AUTO: 2.06 K/UL
LYMPHOCYTES NFR BLD AUTO: 20.8 %
MAN DIFF?: NORMAL
MCHC RBC-ENTMCNC: 22 PG
MCHC RBC-ENTMCNC: 32.4 GM/DL
MCV RBC AUTO: 67.9 FL
MONOCYTES # BLD AUTO: 0.53 K/UL
MONOCYTES NFR BLD AUTO: 5.4 %
NEUTROPHILS # BLD AUTO: 7.23 K/UL
NEUTROPHILS NFR BLD AUTO: 73.1 %
PLATELET # BLD AUTO: 296 K/UL
POTASSIUM SERPL-SCNC: 5.1 MMOL/L
PROT SERPL-MCNC: 7.2 G/DL
RBC # BLD: 5.01 M/UL
RBC # FLD: 15.6 %
SODIUM SERPL-SCNC: 127 MMOL/L
WBC # FLD AUTO: 9.89 K/UL

## 2021-07-30 NOTE — HISTORY OF PRESENT ILLNESS
[FreeTextEntry1] : This is a 60-year-old woman she has a history of dermatomyositis she has had rashes muscle weakness elevated CPK she been doing well on tapering doses of methylprednisolone her current dose is 16 mg every day she is also taking methotrexate 15 mg a week and folic acid she was previously on Bactrim but developed a rash this is discontinued she has no respiratory symptoms no coughing or shortness of breath no fevers or chills no muscle weakness or muscle pain she did have issues with hyponatremia this appears to be related to diuretic use and free water intake this is being adjusted by her cardiologist Dr. Johnson she comes in today to update bloods and discuss additional management issues

## 2021-07-30 NOTE — ASSESSMENT
[FreeTextEntry1] : This is a 60-year-old woman she has had dermatomyositis skin and muscle involvement on tapering doses of methylprednisolone I am updating bloods today including CPK she is also had hyponatremia related to diuretic useBeing managed by cardiology I am updating bloods today as well as sodium levels and CPK I plan to be reducing methylprednisolone to either 12 or 8 mg after review of labs bloods will be drawn in office

## 2021-07-30 NOTE — PHYSICAL EXAM
[General Appearance - Alert] : alert [General Appearance - In No Acute Distress] : in no acute distress [General Appearance - Well Nourished] : well nourished [General Appearance - Well Developed] : well developed [Sclera] : the sclera and conjunctiva were normal [PERRL With Normal Accommodation] : pupils were equal in size, round, and reactive to light [Exaggerated Use Of Accessory Muscles For Inspiration] : no accessory muscle use [Auscultation Breath Sounds / Voice Sounds] : lungs were clear to auscultation bilaterally [Edema] : there was no peripheral edema [Abnormal Walk] : normal gait [Musculoskeletal - Swelling] : no joint swelling seen [Motor Tone] : muscle strength and tone were normal [] : no rash [No Focal Deficits] : no focal deficits [FreeTextEntry1] : Facial erythema noted

## 2021-07-30 NOTE — REVIEW OF SYSTEMS
[Fever] : no fever [Chills] : no chills [Feeling Poorly] : not feeling poorly [Feeling Tired] : not feeling tired [Dry Eyes] : no dryness of the eyes [Shortness Of Breath] : no shortness of breath [Cough] : no cough [Arthralgias] : no arthralgias [Joint Pain] : no joint pain [Skin Lesions] : no skin lesions [Itching] : no itching [Limb Weakness] : no limb weakness [Difficulty Walking] : no difficulty walking

## 2021-09-17 ENCOUNTER — APPOINTMENT (OUTPATIENT)
Dept: BREAST CENTER | Facility: CLINIC | Age: 61
End: 2021-09-17
Payer: COMMERCIAL

## 2021-09-17 ENCOUNTER — RESULT REVIEW (OUTPATIENT)
Age: 61
End: 2021-09-17

## 2021-09-17 VITALS
HEART RATE: 65 BPM | BODY MASS INDEX: 22.15 KG/M2 | WEIGHT: 125 LBS | SYSTOLIC BLOOD PRESSURE: 141 MMHG | DIASTOLIC BLOOD PRESSURE: 76 MMHG | OXYGEN SATURATION: 99 % | HEIGHT: 63 IN

## 2021-09-17 DIAGNOSIS — Z80.41 FAMILY HISTORY OF MALIGNANT NEOPLASM OF OVARY: ICD-10-CM

## 2021-09-17 DIAGNOSIS — R92.8 OTHER ABNORMAL AND INCONCLUSIVE FINDINGS ON DIAGNOSTIC IMAGING OF BREAST: ICD-10-CM

## 2021-09-17 DIAGNOSIS — N60.12 DIFFUSE CYSTIC MASTOPATHY OF LEFT BREAST: ICD-10-CM

## 2021-09-17 DIAGNOSIS — N63.20 UNSPECIFIED LUMP IN THE LEFT BREAST, UNSPECIFIED QUADRANT: ICD-10-CM

## 2021-09-17 DIAGNOSIS — N60.11 DIFFUSE CYSTIC MASTOPATHY OF LEFT BREAST: ICD-10-CM

## 2021-09-17 PROCEDURE — 99204 OFFICE O/P NEW MOD 45 MIN: CPT

## 2021-09-17 NOTE — REASON FOR VISIT
[Initial Evaluation] : an initial evaluation [FreeTextEntry1] : The patient comes in today after routine mammography and ultrasound showed a highly suspicious left breast posterior 6:00 density which was biopsied the same day and she underwent an MRI.

## 2021-09-17 NOTE — PAST MEDICAL HISTORY
[Menarche Age ____] : age at menarche was [unfilled] [Approximately ___] : the LMP was approximately [unfilled] [Total Preg ___] : G[unfilled] [Live Births ___] : P[unfilled]  [Age At Live Birth ___] : Age at live birth: [unfilled] [Postmenopausal] : The patient is postmenopausal [Menopause Age____] : age at menopause was [unfilled] [History of Hormone Replacement Treatment] : has no history of hormone replacement treatment [de-identified] : s/p PAN/ANDRE in 2005

## 2021-09-17 NOTE — ASSESSMENT
[FreeTextEntry1] : The patient is a 60 y.o.G3, P3 postmenopausal white female of [place] descent.  She underwent menarche at age 8 and had her first child at age 27.  She underwent menopause after a PAN/BSO in 2005 at age 45.  She has a strong family history of both ovarian and pancreatic cancer with her mother who had ovarian cancer at age 48 and she has a paternal aunt with ovarian cancer and her father had pancreatic cancer.  There is a questionable history of a BRCA mutation in the family.  The patient herself has a recent diagnosis of dermatomyositis and is on methylprednisolone and methotrexate.  She underwent a screening mammography and ultrasound today on September 17, 2021 showing a highly suspicious new left breast 6:00 posterior density on mammography which corresponded to a 1 x 0.9 x 0.7 cm density in ultrasound and she underwent an ultrasound-guided core biopsy the same day with a Janell  placement and then an MRI showed a localized cancer in the left breast 6:00 posterior region measuring 1.2 x 0.8 x 1.7 cm with no suspicious adenopathy.  The patient was sent directly to the office from radiology and on exam she does have some bruising and biopsy site changes in the lower central region of the left breast.  I reviewed all her images and indeed she has a highly suspicious density in his left breast posterior central region.  The Janell  is in place and the MRI shows this cancer to appear localized.  I spoke to the patient and her  at length and we will need to wait for the official pathology report.   I did speak to her about genetic testing as well.  If this is a localized well-behaved cancer (ER+/TN+ Her2-) she would be a good candidate for upfront surgery with a partial mastectomy and sentinel lymph node biopsy.  She understands that if she tests gene positive she may be a candidate for bilateral mastectomy and reconstruction.  If the cancer is a more aggressive subtype, she understood the possible need for neoadjuvant chemotherapy.  I will need to await the pathology report and then we can decide on a treatment plan.  I would first send genetic testing if this turns out to be cancer.  All their questions were answered including risk/benefits of breast surgery and I will await the official pathology and receptor report and will go from there.  She would need preoperative medical clearance prior to any surgery due to this recent history of dermatomyositis.

## 2021-09-17 NOTE — HISTORY OF PRESENT ILLNESS
[FreeTextEntry1] : The patient is a 60 y.o. G3, P3 postmenopausal white female of [place] descent.  She underwent menarche at age 8 and had her first child at age 27.  She underwent menopause after a PAN/BSO in 2005 at age 45.  She has a strong family history of both ovarian and pancreatic cancer with her mother who had ovarian cancer at age 48 and she has a paternal aunt with ovarian cancer and her father had pancreatic cancer.  There is a questionable history of a BRCA mutation in the family.  The patient herself has a recent diagnosis of dermatomyositis and is on methylprednisolone and methotrexate.  She underwent a screening mammography and ultrasound today on September 17, 2021 showing a highly suspicious new left breast 6:00 posterior density on mammography which corresponded to a 1 x 0.9 x 0.7 cm density in ultrasound and she underwent an ultrasound-guided core biopsy the same day with a Janell  placement and then an MRI showed a localized cancer in the left breast 6:00 posterior region measuring 1.2 x 0.8 x 1.7 cm with no suspicious adenopathy.  She comes in now on the same day for a surgical evaluation.

## 2021-09-17 NOTE — PHYSICAL EXAM
[Normocephalic] : normocephalic [EOMI] : extra ocular movement intact [Atraumatic] : atraumatic [Supple] : supple [No Supraclavicular Adenopathy] : no supraclavicular adenopathy [No Cervical Adenopathy] : no cervical adenopathy [Examined in the supine and seated position] : examined in the supine and seated position [No dominant masses] : no dominant masses in right breast  [No Nipple Retraction] : no left nipple retraction [No Nipple Discharge] : no left nipple discharge [Breast Mass Right Breast ___cm] : no masses [Breast Nipple Inversion] : nipples not inverted [Breast Nipple Retraction] : nipples not retracted [Breast Nipple Flattening] : nipples not flattened [Breast Nipple Fissures] : nipples not fissured [Breast Abnormal Lactation (Galactorrhea)] : no galactorrhea [Breast Abnormal Secretion Bloody Fluid] : no bloody discharge [Breast Abnormal Secretion Serous Fluid] : no serous discharge [Breast Abnormal Secretion Opalescent Fluid] : no milky discharge [No Axillary Lymphadenopathy] : no left axillary lymphadenopathy [No Edema] : no edema [No Rashes] : no rashes [No Ulceration] : no ulceration [de-identified] : Bruising and postbiopsy changes in the lower central region of the left breast. [de-identified] : On exam, the patient has B-cup breasts.  On palpation, I cannot feel any suspicious densities but she does have some bruising and biopsy site changes in the lower central region of the left breast.  She has no axillary, supraclavicular, or cervical adenopathy. [de-identified] : Lower central bruising and biopsy site changes [de-identified] : Bruising in lower central region

## 2021-09-20 ENCOUNTER — TRANSCRIPTION ENCOUNTER (OUTPATIENT)
Age: 61
End: 2021-09-20

## 2021-09-20 ENCOUNTER — NON-APPOINTMENT (OUTPATIENT)
Age: 61
End: 2021-09-20

## 2021-09-21 ENCOUNTER — NON-APPOINTMENT (OUTPATIENT)
Age: 61
End: 2021-09-21

## 2021-10-15 ENCOUNTER — NON-APPOINTMENT (OUTPATIENT)
Age: 61
End: 2021-10-15

## 2021-10-15 ENCOUNTER — TRANSCRIPTION ENCOUNTER (OUTPATIENT)
Age: 61
End: 2021-10-15

## 2021-10-28 ENCOUNTER — APPOINTMENT (OUTPATIENT)
Dept: RHEUMATOLOGY | Facility: CLINIC | Age: 61
End: 2021-10-28
Payer: COMMERCIAL

## 2021-10-28 VITALS
BODY MASS INDEX: 22.15 KG/M2 | WEIGHT: 125 LBS | SYSTOLIC BLOOD PRESSURE: 149 MMHG | HEIGHT: 63 IN | DIASTOLIC BLOOD PRESSURE: 83 MMHG | TEMPERATURE: 97.9 F | HEART RATE: 67 BPM | OXYGEN SATURATION: 100 %

## 2021-10-28 DIAGNOSIS — M60.9 MYOSITIS, UNSPECIFIED: ICD-10-CM

## 2021-10-28 PROCEDURE — 36415 COLL VENOUS BLD VENIPUNCTURE: CPT

## 2021-10-28 PROCEDURE — 99214 OFFICE O/P EST MOD 30 MIN: CPT | Mod: 25

## 2021-10-28 RX ORDER — TRIAMCINOLONE ACETONIDE 0.25 MG/G
0.03 CREAM TOPICAL 3 TIMES DAILY
Qty: 1 | Refills: 5 | Status: ACTIVE | COMMUNITY
Start: 2021-04-30 | End: 1900-01-01

## 2021-10-29 LAB — CK SERPL-CCNC: 53 U/L

## 2021-10-29 NOTE — PHYSICAL EXAM
[General Appearance - Alert] : alert [General Appearance - In No Acute Distress] : in no acute distress [General Appearance - Well Nourished] : well nourished [General Appearance - Well Developed] : well developed [Sclera] : the sclera and conjunctiva were normal [Respiration, Rhythm And Depth] : normal respiratory rhythm and effort [Exaggerated Use Of Accessory Muscles For Inspiration] : no accessory muscle use [Auscultation Breath Sounds / Voice Sounds] : lungs were clear to auscultation bilaterally [Edema] : there was no peripheral edema [Abnormal Walk] : normal gait [Nail Clubbing] : no clubbing  or cyanosis of the fingernails [Musculoskeletal - Swelling] : no joint swelling seen [Motor Tone] : muscle strength and tone were normal [Skin Color & Pigmentation] : normal skin color and pigmentation [] : no rash [FreeTextEntry1] : There are is no stigmata of dermatomyositis

## 2021-10-29 NOTE — ASSESSMENT
[FreeTextEntry1] : This is a 60-year-old woman she has biopsy-proven diagnosis of dermatomyositis she is currently asymptomatic she has been treated initially with high-dose steroids she has been on steroids since February 2021 her current dose of Medrol is 8 mg and her methotrexate dose is 15 mg a week I have repeated her CPK which actually returns again normal I have again discussed with her that her condition is a malignancy associated process or can be a malignancy associated process and treatment of her breast cancer will likely be beneficial to this overall process I am discontinuing her methotrexate and folic acid I have recommended that we reduce her Medrol down to 4 mg a day which she will take until the time of surgery I did suggest that she receive stress doses of steroids on the day of surgery 100 mg of Solu-Cortef or hydrocortisone on call to the OR and then postop resume her 4 mg of Medrol every day I understand there may be concerns of steroids effect on healing and postoperative infection however I would not abruptly discontinue it and that she has been on it for some time but the minimal dose at 4 mg of Medrol day should not be of great concern I will be faxing this recommendation to her breast surgeon-\lashon Melendez MD - fax- [phone]

## 2021-10-29 NOTE — HISTORY OF PRESENT ILLNESS
[FreeTextEntry1] : This is a 60-year-old woman she returns for follow-up visit she has a history of dermatomyositis she has had skin and muscle involvement she has had muscle biopsy i diagnosis she has never had lung disease she was started on steroids initially prednisone 60 mg a day switch to methylprednisolone which she tolerates better she currently at the time of the visit is taking 16 mg of Medrol every day 15 mg of methotrexate once a week and folic acid every day she has recently been diagnosed with breast cancer she is scheduled to undergo breast surgery on November 11, 2021 apparently this will be bilateral mastectomy there is also apparently going to be plastic surgery involvement additional therapy recommendations apparently will await final pathology as well as notes-she is currently doing well she denies any shortness of breath or breathing issues she denies any muscle weakness or muscle tenderness still gets occasional rashes but uses topical cortisone which resolve this.  She is tolerating her current medicines well apparently there is concerns by the surgeons regarding her current use of steroids and its potential impact on surgery and wound healing she does have additional comorbid conditions she has hypertension she is followed by cardiology

## 2021-10-29 NOTE — DATA REVIEWED
[FreeTextEntry1] : I have reviewed her recent CPKs which have been normal I have also reviewed her previous muscle biopsy

## 2021-10-29 NOTE — REVIEW OF SYSTEMS
[As Noted in HPI] : as noted in HPI [Negative] : Gastrointestinal [Shortness Of Breath] : no shortness of breath [SOB on Exertion] : no shortness of breath during exertion

## 2021-11-30 ENCOUNTER — NON-APPOINTMENT (OUTPATIENT)
Age: 61
End: 2021-11-30

## 2022-01-10 ENCOUNTER — NON-APPOINTMENT (OUTPATIENT)
Age: 62
End: 2022-01-10

## 2022-01-13 ENCOUNTER — APPOINTMENT (OUTPATIENT)
Dept: RHEUMATOLOGY | Facility: CLINIC | Age: 62
End: 2022-01-13
Payer: COMMERCIAL

## 2022-01-13 ENCOUNTER — NON-APPOINTMENT (OUTPATIENT)
Age: 62
End: 2022-01-13

## 2022-01-13 VITALS
DIASTOLIC BLOOD PRESSURE: 92 MMHG | SYSTOLIC BLOOD PRESSURE: 195 MMHG | WEIGHT: 123 LBS | HEART RATE: 88 BPM | OXYGEN SATURATION: 99 % | TEMPERATURE: 98.4 F | HEIGHT: 63 IN | BODY MASS INDEX: 21.79 KG/M2

## 2022-01-13 PROCEDURE — 36415 COLL VENOUS BLD VENIPUNCTURE: CPT

## 2022-01-13 PROCEDURE — 99213 OFFICE O/P EST LOW 20 MIN: CPT | Mod: 25

## 2022-01-13 NOTE — PHYSICAL EXAM
[General Appearance - Alert] : alert [General Appearance - In No Acute Distress] : in no acute distress [General Appearance - Well Nourished] : well nourished [General Appearance - Well Developed] : well developed [Edema] : there was no peripheral edema [] : no rash [FreeTextEntry1] : No rash no stigmata of dermatomyositis

## 2022-01-13 NOTE — HISTORY OF PRESENT ILLNESS
[FreeTextEntry1] : 61-year-old woman history of dermatomyositis now currently only on 4 mg of Medrol every day tolerating it well she is status postsurgery for breast cancer she is planning to start chemotherapy as noted including Cytoxan Adriamycin at the end of the month she is feeling well she denies any significant muscle weakness or rashes she does have some fatigue

## 2022-01-13 NOTE — ASSESSMENT
[FreeTextEntry1] : 61-year-old woman history of dermatomyositis will be updating bloods today and CPK no evidence of active disease will likely reduce\par Down to 2 mg a day anticipating discontinue waiting it in a few weeks anticipate that chemotherapy for breast cancer will be likely for adequate treatment

## 2022-01-13 NOTE — REVIEW OF SYSTEMS
[Feeling Poorly] : feeling poorly [Feeling Tired] : feeling tired [Arthralgias] : no arthralgias [Joint Pain] : no joint pain [Joint Swelling] : no joint swelling [Joint Stiffness] : no joint stiffness [Skin Lesions] : no skin lesions [Itching] : no itching [Limb Weakness] : no limb weakness [Difficulty Walking] : no difficulty walking

## 2022-01-14 ENCOUNTER — NON-APPOINTMENT (OUTPATIENT)
Age: 62
End: 2022-01-14

## 2022-01-14 LAB
ALBUMIN SERPL ELPH-MCNC: 4.6 G/DL
ALP BLD-CCNC: 76 U/L
ALT SERPL-CCNC: 84 U/L
ANION GAP SERPL CALC-SCNC: 16 MMOL/L
AST SERPL-CCNC: 47 U/L
BASOPHILS # BLD AUTO: 0.01 K/UL
BASOPHILS NFR BLD AUTO: 0.2 %
BILIRUB SERPL-MCNC: 0.3 MG/DL
BUN SERPL-MCNC: 19 MG/DL
CALCIUM SERPL-MCNC: 9.9 MG/DL
CHLORIDE SERPL-SCNC: 97 MMOL/L
CK SERPL-CCNC: 49 U/L
CO2 SERPL-SCNC: 24 MMOL/L
CREAT SERPL-MCNC: 0.57 MG/DL
EOSINOPHIL # BLD AUTO: 0.04 K/UL
EOSINOPHIL NFR BLD AUTO: 0.6 %
GLUCOSE SERPL-MCNC: 131 MG/DL
HCT VFR BLD CALC: 33.2 %
HGB BLD-MCNC: 10.3 G/DL
IMM GRANULOCYTES NFR BLD AUTO: 0.3 %
LYMPHOCYTES # BLD AUTO: 1.55 K/UL
LYMPHOCYTES NFR BLD AUTO: 24.5 %
MAN DIFF?: NORMAL
MCHC RBC-ENTMCNC: 21.9 PG
MCHC RBC-ENTMCNC: 31 GM/DL
MCV RBC AUTO: 70.5 FL
MONOCYTES # BLD AUTO: 0.48 K/UL
MONOCYTES NFR BLD AUTO: 7.6 %
NEUTROPHILS # BLD AUTO: 4.22 K/UL
NEUTROPHILS NFR BLD AUTO: 66.8 %
PLATELET # BLD AUTO: 294 K/UL
POTASSIUM SERPL-SCNC: 3.9 MMOL/L
PROT SERPL-MCNC: 6.9 G/DL
RBC # BLD: 4.71 M/UL
RBC # FLD: 16 %
SODIUM SERPL-SCNC: 137 MMOL/L
WBC # FLD AUTO: 6.32 K/UL

## 2022-10-10 ENCOUNTER — NON-APPOINTMENT (OUTPATIENT)
Age: 62
End: 2022-10-10

## 2022-10-13 ENCOUNTER — APPOINTMENT (OUTPATIENT)
Dept: RHEUMATOLOGY | Facility: CLINIC | Age: 62
End: 2022-10-13

## 2022-10-13 VITALS
TEMPERATURE: 98 F | HEART RATE: 75 BPM | WEIGHT: 128 LBS | BODY MASS INDEX: 22.68 KG/M2 | OXYGEN SATURATION: 97 % | HEIGHT: 63 IN | DIASTOLIC BLOOD PRESSURE: 89 MMHG | SYSTOLIC BLOOD PRESSURE: 172 MMHG

## 2022-10-13 PROCEDURE — 99213 OFFICE O/P EST LOW 20 MIN: CPT | Mod: 25

## 2022-10-13 PROCEDURE — 36415 COLL VENOUS BLD VENIPUNCTURE: CPT

## 2022-10-14 LAB — CK SERPL-CCNC: 48 U/L

## 2022-10-14 NOTE — ASSESSMENT
[FreeTextEntry1] : 61-year-old woman returns with her  has a history of dermatomyositis initially presenting with rash and joint pains treated with steroids and methotrexate subsequently was diagnosed with breast cancer now status post surgery chemotherapy has had recent surgical implants was concerned about possibly return of symptoms clinically has no evidence we will be updating CPK and reviewed with patient blood will be drawn in the office sent to core lab for processing

## 2022-10-14 NOTE — PHYSICAL EXAM
[General Appearance - Alert] : alert [General Appearance - In No Acute Distress] : in no acute distress [General Appearance - Well Nourished] : well nourished [General Appearance - Well Developed] : well developed [Nail Clubbing] : no clubbing  or cyanosis of the fingernails [] : no rash [FreeTextEntry1] : No weakness or tenderness of muscles all groups

## 2022-10-14 NOTE — HISTORY OF PRESENT ILLNESS
[FreeTextEntry1] : This is a 61-year-old woman she returns for reevaluation she was initially seen by me and January 2021 at that point she was diagnosed with dermatomyositis with rashes and muscle pains muscle weakness CPKs of 2000 she was started on prednisone but subsequently methylprednisolone in combination with methotrexate with normalization of CPK she did receive Bactrim at one point as a prophylaxis but had a sulfur allergy she normalized her CPK and was doing well however was found to have a breast cancer she did receive 6 chemotherapy she recently had bilateral reconstructive surgery with implants she has been off methotrexate off prednisone for some time she has had sodium issues and hypertension issues she is followed by a nephrologist she currently takes sodium pills twice a day she is also on metoprolol she does use Lasix and has been placed on hydralazine she has been on this for about 6 months and she tolerates this well she comes in now because she does have some skin discomfort at times she also describes some burning type pains but no muscle weakness no muscle tenderness and no rashes her most recent surgery was 2 weeks ago

## 2023-06-09 ENCOUNTER — RESULT REVIEW (OUTPATIENT)
Age: 63
End: 2023-06-09

## 2023-06-09 ENCOUNTER — OUTPATIENT (OUTPATIENT)
Dept: OUTPATIENT SERVICES | Facility: HOSPITAL | Age: 63
LOS: 1 days | End: 2023-06-09
Payer: COMMERCIAL

## 2023-06-09 ENCOUNTER — APPOINTMENT (OUTPATIENT)
Dept: RADIOLOGY | Facility: CLINIC | Age: 63
End: 2023-06-09

## 2023-06-09 ENCOUNTER — APPOINTMENT (OUTPATIENT)
Dept: RHEUMATOLOGY | Facility: CLINIC | Age: 63
End: 2023-06-09
Payer: COMMERCIAL

## 2023-06-09 VITALS
HEIGHT: 63 IN | WEIGHT: 131 LBS | TEMPERATURE: 98 F | BODY MASS INDEX: 23.21 KG/M2 | OXYGEN SATURATION: 99 % | SYSTOLIC BLOOD PRESSURE: 173 MMHG | DIASTOLIC BLOOD PRESSURE: 93 MMHG | HEART RATE: 67 BPM

## 2023-06-09 DIAGNOSIS — M85.80 OTHER SPECIFIED DISORDERS OF BONE DENSITY AND STRUCTURE, UNSPECIFIED SITE: ICD-10-CM

## 2023-06-09 DIAGNOSIS — M33.90 DERMATOPOLYMYOSITIS, UNSPECIFIED, ORGAN INVOLVEMENT UNSPECIFIED: ICD-10-CM

## 2023-06-09 DIAGNOSIS — Z98.890 OTHER SPECIFIED POSTPROCEDURAL STATES: Chronic | ICD-10-CM

## 2023-06-09 PROCEDURE — 99214 OFFICE O/P EST MOD 30 MIN: CPT | Mod: 25

## 2023-06-09 PROCEDURE — 77080 DXA BONE DENSITY AXIAL: CPT | Mod: 26

## 2023-06-09 PROCEDURE — 36415 COLL VENOUS BLD VENIPUNCTURE: CPT

## 2023-06-09 RX ORDER — METHYLPREDNISOLONE 4 MG/1
4 TABLET ORAL DAILY
Qty: 30 | Refills: 6 | Status: DISCONTINUED | COMMUNITY
Start: 2021-10-28 | End: 2023-06-09

## 2023-06-09 RX ORDER — METHOTREXATE 2.5 MG/1
2.5 TABLET ORAL
Qty: 78 | Refills: 3 | Status: DISCONTINUED | COMMUNITY
Start: 2021-05-20 | End: 2023-06-09

## 2023-06-09 RX ORDER — HYDRALAZINE HYDROCHLORIDE 25 MG/1
25 TABLET ORAL
Refills: 0 | Status: ACTIVE | COMMUNITY

## 2023-06-09 RX ORDER — CHLORTHALIDONE 25 MG/1
25 TABLET ORAL
Refills: 0 | Status: DISCONTINUED | COMMUNITY
End: 2023-06-09

## 2023-06-09 RX ORDER — PREDNISONE 20 MG/1
20 TABLET ORAL
Qty: 90 | Refills: 6 | Status: DISCONTINUED | COMMUNITY
Start: 2021-02-18 | End: 2023-06-09

## 2023-06-09 RX ORDER — FOLIC ACID 1 MG/1
1 TABLET ORAL DAILY
Qty: 90 | Refills: 3 | Status: DISCONTINUED | COMMUNITY
Start: 2021-05-20 | End: 2023-06-09

## 2023-06-09 RX ORDER — SERTRALINE HYDROCHLORIDE 50 MG/1
50 TABLET, FILM COATED ORAL
Refills: 0 | Status: DISCONTINUED | COMMUNITY
End: 2023-06-09

## 2023-06-09 RX ORDER — METHYLPREDNISOLONE 2 MG/1
2 TABLET ORAL DAILY
Qty: 30 | Refills: 6 | Status: DISCONTINUED | COMMUNITY
Start: 2022-01-13 | End: 2023-06-09

## 2023-06-09 RX ORDER — FUROSEMIDE 20 MG/1
20 TABLET ORAL
Refills: 0 | Status: ACTIVE | COMMUNITY

## 2023-06-09 RX ORDER — METHYLPREDNISOLONE 16 MG/1
16 TABLET ORAL DAILY
Qty: 30 | Refills: 6 | Status: DISCONTINUED | COMMUNITY
Start: 2021-02-22 | End: 2023-06-09

## 2023-06-09 NOTE — DATA REVIEWED
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 \par \par There was also evidence of moderate-to-severe median nerve entrapments at both wrists, with severe sensorimotor slowing and about 50% motor conduction block bilaterally. \par \par There was no evidence of polyneuropathy on this study. \par  \par Electronically signed by : GUMARO HAGER MD; Jan 27 2021  1:32PM EST (Author)\par \par

## 2023-06-09 NOTE — ASSESSMENT
[FreeTextEntry1] : 61-year-old woman returns for follow up, here with her .  Patient with a history of dermatomyositis, initially presenting with rash and joint pains, elevated CPK, treated with steroids and methotrexate. Patient with  subsequently diagnosed with breast cancer, triple negative, stage 1, now status post surgery, chemotherapy and reconstruction with implants.  Patient returns today given concern for possible return of symptoms.  At this time, clinically has no evidence of active dermatomyositis, will be labs today including CPK, CBC, CMP, TSH, and B12 and folate. Given osteopenia and previous steroid treatment, will repeat bone density as well. Further management pending results.

## 2023-06-09 NOTE — PHYSICAL EXAM
[General Appearance - Alert] : alert [General Appearance - In No Acute Distress] : in no acute distress [General Appearance - Well-Appearing] : healthy appearing [Sclera] : the sclera and conjunctiva were normal [Respiration, Rhythm And Depth] : normal respiratory rhythm and effort [Exaggerated Use Of Accessory Muscles For Inspiration] : no accessory muscle use [Heart Rate And Rhythm] : heart rate was normal and rhythm regular [Auscultation Breath Sounds / Voice Sounds] : lungs were clear to auscultation bilaterally [Heart Sounds] : normal S1 and S2 [Edema] : there was no peripheral edema [No Spinal Tenderness] : no spinal tenderness [Abnormal Walk] : normal gait [Nail Clubbing] : no clubbing  or cyanosis of the fingernails [Musculoskeletal - Swelling] : no joint swelling seen [Motor Tone] : muscle strength and tone were normal [] : no rash [Skin Lesions] : no skin lesions [Oriented To Time, Place, And Person] : oriented to person, place, and time [Impaired Insight] : insight and judgment were intact [Affect] : the affect was normal [Mood] : the mood was normal

## 2023-06-09 NOTE — HISTORY OF PRESENT ILLNESS
[FreeTextEntry1] : June 9, 2023\par Patient is concerned about possible flare of symptoms\par Blood pressure elevated, feels although this is elevated usually even higher than today\par Concerned about burning sensation of skin, patient reports has been present for while\par No rashes\par Burning pain ongoing \par History of of Breast cancer\par Treated with chemotherapy, s/p lumpectomy\par Chemo finished, 6/2022\par Triple negative stage 1\par Implants end of 9/2022\par Muscle pain\par Feels weakness as though feels not strong\par Now walking for majority of exercise\par No recent blood tests\par Reviewed bone density, no history of fracture\par Vitamin d3 1000 one per day\par lisinopril 20\par hydralazine 25\par metoprolol 100\par Patient is here with her \par \par \par \par \par This is a 61-year-old woman she returns for reevaluation she was initially seen by me and January 2021 at that point she was diagnosed with dermatomyositis with rashes and muscle pains muscle weakness CPKs of 2000 she was started on prednisone but subsequently methylprednisolone in combination with methotrexate with normalization of CPK she did receive Bactrim at one point as a prophylaxis but had a sulfur allergy she normalized her CPK and was doing well however was found to have a breast cancer she did receive 6 chemotherapy she recently had bilateral reconstructive surgery with implants she has been off methotrexate off prednisone for some time she has had sodium issues and hypertension issues she is followed by a nephrologist she currently takes sodium pills twice a day she is also on metoprolol she does use Lasix and has been placed on hydralazine she has been on this for about 6 months and she tolerates this well she comes in now because she does have some skin discomfort at times she also describes some burning type pains but no muscle weakness no muscle tenderness and no rashes her most recent surgery was 2 weeks ago

## 2023-06-13 LAB
ALBUMIN SERPL ELPH-MCNC: 4.8 G/DL
ALP BLD-CCNC: 63 U/L
ALT SERPL-CCNC: 19 U/L
ANION GAP SERPL CALC-SCNC: 12 MMOL/L
AST SERPL-CCNC: 20 U/L
BILIRUB SERPL-MCNC: 0.4 MG/DL
BUN SERPL-MCNC: 14 MG/DL
CALCIUM SERPL-MCNC: 10.2 MG/DL
CHLORIDE SERPL-SCNC: 92 MMOL/L
CK SERPL-CCNC: 38 U/L
CO2 SERPL-SCNC: 26 MMOL/L
CREAT SERPL-MCNC: 0.52 MG/DL
EGFR: 104 ML/MIN/1.73M2
FOLATE SERPL-MCNC: 4.2 NG/ML
GLUCOSE SERPL-MCNC: 83 MG/DL
POTASSIUM SERPL-SCNC: 4.6 MMOL/L
PROT SERPL-MCNC: 7.3 G/DL
SODIUM SERPL-SCNC: 129 MMOL/L
TSH SERPL-ACNC: 1.01 UIU/ML
VIT B12 SERPL-MCNC: 661 PG/ML